# Patient Record
Sex: MALE | Race: WHITE | NOT HISPANIC OR LATINO | URBAN - METROPOLITAN AREA
[De-identification: names, ages, dates, MRNs, and addresses within clinical notes are randomized per-mention and may not be internally consistent; named-entity substitution may affect disease eponyms.]

---

## 2022-04-29 ENCOUNTER — APPOINTMENT (RX ONLY)
Dept: URBAN - METROPOLITAN AREA CLINIC 60 | Facility: CLINIC | Age: 52
Setting detail: DERMATOLOGY
End: 2022-04-29

## 2022-04-29 PROBLEM — C44.311 BASAL CELL CARCINOMA OF SKIN OF NOSE: Status: ACTIVE | Noted: 2022-04-29

## 2022-04-29 PROCEDURE — ? ADDITIONAL NOTES

## 2022-04-29 PROCEDURE — 99203 OFFICE O/P NEW LOW 30 MIN: CPT

## 2022-04-29 PROCEDURE — ? DEFER

## 2022-04-29 PROCEDURE — ? COUNSELING

## 2022-04-29 NOTE — HPI: EVALUATION OF SKIN LESION(S)
What Type Of Note Output Would You Prefer (Optional)?: Bullet Format
Hpi Title: Evaluation of a Skin Lesion
How Severe Are Your Spot(S)?: mild
Additional History: Patient was Dx with basal cell carcinoma. Biopsy done with [facility] dermatology 03/11/2022. Left nasal ala . Starting radiation therapy 06/13/2022 3 x a week 13 treatments

## 2022-04-29 NOTE — PROCEDURE: DEFER
Detail Level: Detailed
Introduction Text (Please End With A Colon): The following procedure was
Other Procedure: Starting radiation therapy 06/13/2022 3 x a week 13 treatments with blue [facility] dermatology